# Patient Record
Sex: MALE | Race: WHITE
[De-identification: names, ages, dates, MRNs, and addresses within clinical notes are randomized per-mention and may not be internally consistent; named-entity substitution may affect disease eponyms.]

---

## 2021-09-28 ENCOUNTER — HOSPITAL ENCOUNTER (EMERGENCY)
Dept: HOSPITAL 12 - ER | Age: 26
Discharge: HOME | End: 2021-09-28
Payer: MEDICAID

## 2021-09-28 VITALS — DIASTOLIC BLOOD PRESSURE: 77 MMHG | SYSTOLIC BLOOD PRESSURE: 128 MMHG

## 2021-09-28 VITALS — HEIGHT: 74 IN | BODY MASS INDEX: 21.56 KG/M2 | WEIGHT: 168 LBS
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
## 2021-09-28 DIAGNOSIS — L08.9: ICD-10-CM

## 2021-09-28 DIAGNOSIS — S61.451A: ICD-10-CM
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
## 2021-09-28 DIAGNOSIS — S51.851A: Primary | ICD-10-CM

## 2021-09-28 LAB
BUN SERPL-MCNC: 13 MG/DL (ref 7–18)
CHLORIDE SERPL-SCNC: 101 MMOL/L (ref 98–107)
CO2 SERPL-SCNC: 28 MMOL/L (ref 21–32)
CREAT SERPL-MCNC: 1.1 MG/DL (ref 0.6–1.3)
GLUCOSE SERPL-MCNC: 90 MG/DL (ref 74–106)
HCT VFR BLD AUTO: 38.8 % (ref 36.7–47.1)
MCH RBC QN AUTO: 31.4 UUG (ref 23.8–33.4)
MCV RBC AUTO: 90 FL (ref 73–96.2)
PLATELET # BLD AUTO: 211 K/UL (ref 152–348)
POTASSIUM SERPL-SCNC: 4.2 MMOL/L (ref 3.5–5.1)

## 2021-09-28 PROCEDURE — A4663 DIALYSIS BLOOD PRESSURE CUFF: HCPCS

## 2021-09-28 NOTE — NUR
6 superficial irritated punture wounds on lt forearm and elbow pressure 
irrigated with copious amts of NS/betadine soln, as well as 3 wounds  circa rt 
wrist.  bacitracin applied with fresh gauze and wrapped with 6in and 1in cling 
and 4in tube gauze.  PMS intact bilat with cap refil<3sec.

## 2021-09-28 NOTE — NUR
Pt DCed home with aftercare instructions.  Pt told to elevate as much as 
possible, to decrease pain and swelling. Also to keep wounds and dressing clean 
and dry, wash with soap and water if soiled.  Ideally should keep dressing on 
for at least 40 hours to given time for wounds to seal up.  Pt has good color 
and appearance.  VSS, PE WNL, otherwise completely healthy individual.  Pt 
happy and thankful of service.  Denies any pain or discomfort and no s/sx of 
distress noted.

## 2021-09-28 NOTE — NUR
EDMD Dr. Cain assessed pt, observed several puncture wounds on rt hand and 
lt forearm, cultured several puss filled irritated puncture wounds.  Order 
given for augmentin loading dose and to cleanse and dress wounds.